# Patient Record
Sex: MALE | Race: WHITE | NOT HISPANIC OR LATINO | Employment: FULL TIME | URBAN - NONMETROPOLITAN AREA
[De-identification: names, ages, dates, MRNs, and addresses within clinical notes are randomized per-mention and may not be internally consistent; named-entity substitution may affect disease eponyms.]

---

## 2020-01-19 ENCOUNTER — HOSPITAL ENCOUNTER (EMERGENCY)
Facility: HOSPITAL | Age: 35
Discharge: HOME | End: 2020-01-19
Attending: EMERGENCY MEDICINE
Payer: OTHER MISCELLANEOUS

## 2020-01-19 ENCOUNTER — APPOINTMENT (EMERGENCY)
Dept: CT IMAGING | Facility: HOSPITAL | Age: 35
End: 2020-01-19
Payer: OTHER MISCELLANEOUS

## 2020-01-19 VITALS
RESPIRATION RATE: 18 BRPM | SYSTOLIC BLOOD PRESSURE: 108 MMHG | HEART RATE: 78 BPM | OXYGEN SATURATION: 95 % | DIASTOLIC BLOOD PRESSURE: 56 MMHG | WEIGHT: 227.96 LBS | TEMPERATURE: 98.6 F | BODY MASS INDEX: 33.76 KG/M2 | HEIGHT: 69 IN
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
## 2020-01-19 DIAGNOSIS — S06.0X0A CONCUSSION WITH NO LOSS OF CONSCIOUSNESS: Primary | ICD-10-CM

## 2020-01-19 PROCEDURE — 99284 EMERGENCY DEPT VISIT MOD MDM: CPT

## 2020-01-19 PROCEDURE — 70450 CT HEAD/BRAIN W/O DYE: CPT

## 2020-01-19 PROCEDURE — 99284 EMERGENCY DEPT VISIT MOD MDM: CPT | Performed by: EMERGENCY MEDICINE

## 2020-01-19 RX ORDER — ACETAMINOPHEN 325 MG/1
650 TABLET ORAL ONCE
Status: COMPLETED | OUTPATIENT
Start: 2020-01-19 | End: 2020-01-19

## 2020-01-19 RX ORDER — ONDANSETRON 4 MG/1
4 TABLET, ORALLY DISINTEGRATING ORAL ONCE
Status: COMPLETED | OUTPATIENT
Start: 2020-01-19 | End: 2020-01-19

## 2020-01-19 RX ORDER — ONDANSETRON 4 MG/1
4 TABLET, ORALLY DISINTEGRATING ORAL EVERY 8 HOURS PRN
Qty: 20 TABLET | Refills: 0 | Status: SHIPPED | OUTPATIENT
Start: 2020-01-19 | End: 2020-09-16 | Stop reason: ALTCHOICE

## 2020-01-19 RX ADMIN — ONDANSETRON 4 MG: 4 TABLET, ORALLY DISINTEGRATING ORAL at 20:19

## 2020-01-19 RX ADMIN — ACETAMINOPHEN 650 MG: 325 TABLET, FILM COATED ORAL at 20:19

## 2020-01-20 NOTE — ED PROVIDER NOTES
History  Chief Complaint   Patient presents with    Head Injury     patient reports that he fell backwards after slipping on ice 1/2 hour, striking his posterior head on sidewalk  denies any loc or being on blood thinners  he does reports nausea and headache  31 yo left hand dominant male  sustained a same level fall backwards after slipping on some ice approximately 30 minutes prior to arrival   There was no loss of consciousness he is not anticoagulated he was wearing as hat and park a [place] at the time  He complains of posterior left-sided headache  He feels his vision is slightly fun fuzzy and he is nauseated but he has had no vomiting he denies prior history of head injury or concussions  There is no malocclusion denies any neck pain he has no numbness or tingling  He denies any upper lower back pain no rib pain no shortness of breath or chest pain he has no abdominal or hip pain  Prior to this he was in his usual state of health  None       History reviewed  No pertinent past medical history  History reviewed  No pertinent surgical history  History reviewed  No pertinent family history  I have reviewed and agree with the history as documented  Social History     Tobacco Use    Smoking status: Current Every Day Smoker     Packs/day: 0 50     Types: Cigarettes    Smokeless tobacco: Never Used   Substance Use Topics    Alcohol use: Yes     Comment: social    Drug use: Never        Review of Systems   Constitutional: Negative for activity change, chills and fever  HENT: Negative for congestion, ear pain, rhinorrhea, sneezing and sore throat  Eyes: Positive for visual disturbance (fuzzy)  Negative for discharge  Respiratory: Negative for cough and shortness of breath  Cardiovascular: Negative for chest pain and leg swelling  Gastrointestinal: Positive for nausea  Negative for abdominal pain, blood in stool, diarrhea and vomiting     Endocrine: Negative for polyuria  Musculoskeletal: Negative for back pain, myalgias, neck pain and neck stiffness  Skin: Negative for rash  Neurological: Positive for headaches  Negative for dizziness, weakness, light-headedness and numbness  Hematological: Negative for adenopathy  Psychiatric/Behavioral: Negative for confusion  All other systems reviewed and are negative  Physical Exam  Physical Exam   Constitutional: He is oriented to person, place, and time  He appears well-developed  No distress  HENT:   Head: Normocephalic and atraumatic  Right Ear: External ear normal    Left Ear: External ear normal    Nose: Nose normal    Mouth/Throat: Oropharynx is clear and moist  No oropharyngeal exudate  TMS pale bilaterally; atraumatic no tenderness to scalp no evidence of contusion  Eyes: Pupils are equal, round, and reactive to light  Conjunctivae and EOM are normal  Right eye exhibits no discharge  Left eye exhibits no discharge  3mm equal pupils Visual acuity rosenbalm  uncorrected 20/30 on the left 2025-1 on the right; no Khari Antoine pupil  Neck: Normal range of motion  Neck supple  No distracting injury no midline or paraspinous tenderness  Cardiovascular: Normal rate, regular rhythm, normal heart sounds and intact distal pulses  Pulmonary/Chest: Effort normal and breath sounds normal  No stridor  No respiratory distress  He has no wheezes  He has no rales  He exhibits no tenderness  Abdominal: Soft  Bowel sounds are normal  He exhibits no distension and no mass  There is no tenderness  There is no rebound and no guarding  Musculoskeletal: Normal range of motion  He exhibits no edema, tenderness or deformity  Neurological: He is alert and oriented to person, place, and time  He displays normal reflexes  No cranial nerve deficit  He exhibits normal muscle tone  Coordination normal    GCS 15; no pronator drift equal hand    Skin: He is not diaphoretic  Vitals reviewed        Vital Signs  ED Triage Vitals [01/19/20 1944]   Temperature Pulse Respirations Blood Pressure SpO2   98 6 °F (37 °C) 94 17 144/79 97 %      Temp Source Heart Rate Source Patient Position - Orthostatic VS BP Location FiO2 (%)   Temporal Monitor Sitting Right arm --      Pain Score       4           Vitals:    01/19/20 1944 01/19/20 2015 01/19/20 2030 01/19/20 2130   BP: 144/79 117/65 117/62 108/56   Pulse: 94 87 82 78   Patient Position - Orthostatic VS: Sitting            Visual Acuity  Visual Acuity      Most Recent Value   L Pupil Size (mm)  3   R Pupil Size (mm)  3          ED Medications  Medications   ondansetron (ZOFRAN-ODT) dispersible tablet 4 mg (4 mg Oral Given 1/19/20 2019)   acetaminophen (TYLENOL) tablet 650 mg (650 mg Oral Given 1/19/20 2019)       Diagnostic Studies  Results Reviewed     None                 CT head without contrast   Final Result by Danisha Serrato DO (01/19 2120)      No acute intracranial abnormality  Workstation performed: [id number]                    Procedures  Procedures         ED Course  ED Course as of Jan 20 2522   Sherryle Smoke Reviewed CT findings with the patient  He feels improved  He is to follow up with care now for further occupational health  Reviewed if he has persistent symptoms may benefit from neuropsych testing  Otherwise activity as tolerated Zofran as needed for nausea  MDM  Number of Diagnoses or Management Options  Diagnosis management comments: Mdm:  Patient demonstrating nonfocal neuro exam   Will proceed with CT head secondary to same level fall  Initiate symptomatic management with Zofran and Tylenol          Disposition  Final diagnoses:   Concussion with no loss of consciousness   Fall due to slipping on ice or snow     Time reflects when diagnosis was documented in both MDM as applicable and the Disposition within this note     Time User Action Codes Description Comment    1/19/2020 10:12 PM Charlie Cunha Bibiana [S06 0X0A] Concussion with no loss of consciousness     1/19/2020 10:20 PM Radha Morales [W00  [id number]] Fall due to slipping on ice or snow       ED Disposition     ED Disposition Condition Date/Time Comment    Discharge Stable Sun Jan 19, 2020 10:22 PM Moy Wilhelm discharge to home/self care  Follow-up Information     Follow up With Specialties Details Why Contact Info Additional Information    [facility] Go to  workman's comp eval no appoiment needed [address] [place] [id number]  [phone] [address] [place] [address], [place] [address]          Discharge Medication List as of 1/19/2020 10:26 PM      START taking these medications    Details   ondansetron (ZOFRAN-ODT) 4 mg disintegrating tablet Take 1 tablet (4 mg total) by mouth every 8 (eight) hours as needed for nausea or vomiting for up to 20 doses, Starting Sun 1/19/2020, Print           No discharge procedures on file      ED Provider  Electronically Signed by           Valerie Preciado MD  01/20/20 8457

## 2020-04-23 ENCOUNTER — HOSPITAL ENCOUNTER (EMERGENCY)
Facility: HOSPITAL | Age: 35
Discharge: HOME | End: 2020-04-23
Attending: EMERGENCY MEDICINE | Admitting: EMERGENCY MEDICINE
Payer: COMMERCIAL

## 2020-04-23 ENCOUNTER — APPOINTMENT (EMERGENCY)
Dept: RADIOLOGY | Facility: HOSPITAL | Age: 35
End: 2020-04-23
Payer: COMMERCIAL

## 2020-04-23 VITALS
BODY MASS INDEX: 33.47 KG/M2 | SYSTOLIC BLOOD PRESSURE: 107 MMHG | DIASTOLIC BLOOD PRESSURE: 52 MMHG | TEMPERATURE: 97.5 F | HEART RATE: 82 BPM | WEIGHT: 225.97 LBS | RESPIRATION RATE: 18 BRPM | HEIGHT: 69 IN | OXYGEN SATURATION: 96 %

## 2020-04-23 DIAGNOSIS — R09.1 PLEURISY: Primary | ICD-10-CM

## 2020-04-23 LAB
ALBUMIN SERPL BCP-MCNC: 4.3 G/DL (ref 3.5–5)
ALP SERPL-CCNC: 65 U/L (ref 46–116)
ALT SERPL W P-5'-P-CCNC: 85 U/L (ref 12–78)
ANION GAP SERPL CALCULATED.3IONS-SCNC: 7 MMOL/L (ref 4–13)
AST SERPL W P-5'-P-CCNC: 29 U/L (ref 5–45)
BASOPHILS # BLD AUTO: 0.05 THOUSANDS/ΜL (ref 0–0.1)
BASOPHILS NFR BLD AUTO: 1 % (ref 0–1)
BILIRUB SERPL-MCNC: 0.4 MG/DL (ref 0.2–1)
BUN SERPL-MCNC: 15 MG/DL (ref 5–25)
CALCIUM SERPL-MCNC: 9 MG/DL (ref 8.3–10.1)
CHLORIDE SERPL-SCNC: 104 MMOL/L (ref 100–108)
CO2 SERPL-SCNC: 29 MMOL/L (ref 21–32)
CREAT SERPL-MCNC: 1.16 MG/DL (ref 0.6–1.3)
D DIMER PPP FEU-MCNC: <0.27 UG/ML FEU
EOSINOPHIL # BLD AUTO: 0.24 THOUSAND/ΜL (ref 0–0.61)
EOSINOPHIL NFR BLD AUTO: 4 % (ref 0–6)
ERYTHROCYTE [DISTWIDTH] IN BLOOD BY AUTOMATED COUNT: 12.2 % (ref 11.6–15.1)
GFR SERPL CREATININE-BSD FRML MDRD: 83 ML/MIN/1.73SQ M
GLUCOSE SERPL-MCNC: 114 MG/DL (ref 65–140)
HCT VFR BLD AUTO: 46 % (ref 36.5–49.3)
HGB BLD-MCNC: 16.1 G/DL (ref 12–17)
IMM GRANULOCYTES # BLD AUTO: 0.01 THOUSAND/UL (ref 0–0.2)
IMM GRANULOCYTES NFR BLD AUTO: 0 % (ref 0–2)
LYMPHOCYTES # BLD AUTO: 3.09 THOUSANDS/ΜL (ref 0.6–4.47)
LYMPHOCYTES NFR BLD AUTO: 51 % (ref 14–44)
MCH RBC QN AUTO: 31.8 PG (ref 26.8–34.3)
MCHC RBC AUTO-ENTMCNC: 35 G/DL (ref 31.4–37.4)
MCV RBC AUTO: 91 FL (ref 82–98)
MONOCYTES # BLD AUTO: 0.81 THOUSAND/ΜL (ref 0.17–1.22)
MONOCYTES NFR BLD AUTO: 14 % (ref 4–12)
NEUTROPHILS # BLD AUTO: 1.81 THOUSANDS/ΜL (ref 1.85–7.62)
NEUTS SEG NFR BLD AUTO: 30 % (ref 43–75)
NRBC BLD AUTO-RTO: 0 /100 WBCS
NT-PROBNP SERPL-MCNC: 16 PG/ML
PLATELET # BLD AUTO: 305 THOUSANDS/UL (ref 149–390)
PMV BLD AUTO: 9.8 FL (ref 8.9–12.7)
POTASSIUM SERPL-SCNC: 3.6 MMOL/L (ref 3.5–5.3)
PROT SERPL-MCNC: 8 G/DL (ref 6.4–8.2)
RBC # BLD AUTO: 5.06 MILLION/UL (ref 3.88–5.62)
SODIUM SERPL-SCNC: 140 MMOL/L (ref 136–145)
TROPONIN I SERPL-MCNC: <0.02 NG/ML
WBC # BLD AUTO: 6.01 THOUSAND/UL (ref 4.31–10.16)

## 2020-04-23 PROCEDURE — 85379 FIBRIN DEGRADATION QUANT: CPT | Performed by: EMERGENCY MEDICINE

## 2020-04-23 PROCEDURE — 80053 COMPREHEN METABOLIC PANEL: CPT | Performed by: EMERGENCY MEDICINE

## 2020-04-23 PROCEDURE — 36415 COLL VENOUS BLD VENIPUNCTURE: CPT | Performed by: EMERGENCY MEDICINE

## 2020-04-23 PROCEDURE — 84484 ASSAY OF TROPONIN QUANT: CPT | Performed by: EMERGENCY MEDICINE

## 2020-04-23 PROCEDURE — 96374 THER/PROPH/DIAG INJ IV PUSH: CPT

## 2020-04-23 PROCEDURE — 93005 ELECTROCARDIOGRAM TRACING: CPT

## 2020-04-23 PROCEDURE — 99284 EMERGENCY DEPT VISIT MOD MDM: CPT

## 2020-04-23 PROCEDURE — 99285 EMERGENCY DEPT VISIT HI MDM: CPT | Performed by: EMERGENCY MEDICINE

## 2020-04-23 PROCEDURE — 85025 COMPLETE CBC W/AUTO DIFF WBC: CPT | Performed by: EMERGENCY MEDICINE

## 2020-04-23 PROCEDURE — 71045 X-RAY EXAM CHEST 1 VIEW: CPT

## 2020-04-23 PROCEDURE — 83880 ASSAY OF NATRIURETIC PEPTIDE: CPT | Performed by: EMERGENCY MEDICINE

## 2020-04-23 RX ORDER — KETOROLAC TROMETHAMINE 30 MG/ML
15 INJECTION, SOLUTION INTRAMUSCULAR; INTRAVENOUS ONCE
Status: COMPLETED | OUTPATIENT
Start: 2020-04-23 | End: 2020-04-23

## 2020-04-23 RX ADMIN — KETOROLAC TROMETHAMINE 15 MG: 30 INJECTION, SOLUTION INTRAMUSCULAR at 21:47

## 2020-04-24 LAB
ATRIAL RATE: 84 BPM
P AXIS: 36 DEGREES
PR INTERVAL: 152 MS
QRS AXIS: 86 DEGREES
QRSD INTERVAL: 92 MS
QT INTERVAL: 360 MS
QTC INTERVAL: 425 MS
T WAVE AXIS: 45 DEGREES
VENTRICULAR RATE: 84 BPM

## 2020-04-24 PROCEDURE — 93010 ELECTROCARDIOGRAM REPORT: CPT | Performed by: INTERNAL MEDICINE

## 2020-09-16 ENCOUNTER — HOSPITAL ENCOUNTER (EMERGENCY)
Facility: HOSPITAL | Age: 35
Discharge: HOME | End: 2020-09-16
Attending: EMERGENCY MEDICINE | Admitting: EMERGENCY MEDICINE
Payer: COMMERCIAL

## 2020-09-16 VITALS
TEMPERATURE: 97.6 F | RESPIRATION RATE: 18 BRPM | BODY MASS INDEX: 33.97 KG/M2 | DIASTOLIC BLOOD PRESSURE: 90 MMHG | OXYGEN SATURATION: 97 % | WEIGHT: 230 LBS | HEART RATE: 95 BPM | SYSTOLIC BLOOD PRESSURE: 152 MMHG

## 2020-09-16 DIAGNOSIS — Z20.822 ENCOUNTER FOR LABORATORY TESTING FOR COVID-19 VIRUS: Primary | ICD-10-CM

## 2020-09-16 PROCEDURE — U0003 INFECTIOUS AGENT DETECTION BY NUCLEIC ACID (DNA OR RNA); SEVERE ACUTE RESPIRATORY SYNDROME CORONAVIRUS 2 (SARS-COV-2) (CORONAVIRUS DISEASE [COVID-19]), AMPLIFIED PROBE TECHNIQUE, MAKING USE OF HIGH THROUGHPUT TECHNOLOGIES AS DESCRIBED BY CMS-2020-01-R: HCPCS | Performed by: EMERGENCY MEDICINE

## 2020-09-16 PROCEDURE — 99284 EMERGENCY DEPT VISIT MOD MDM: CPT | Performed by: EMERGENCY MEDICINE

## 2020-09-16 PROCEDURE — 99282 EMERGENCY DEPT VISIT SF MDM: CPT

## 2020-09-16 NOTE — Clinical Note
Ester Antonio was seen and treated in our emergency department on 9/16/2020  No limitations once cleared  Diagnosis: Screening for COVID-19  Jaziel Ewing    He may return on this date: 09/30/2020    Jaziel Ewing had symptoms possibly suggestive of COVID-19  He was asymptomatic at the time of evaluation  Testing for COVID-19 is pending  He may return to work if asymptomatic for at least 3 days and at least 7 days have passed since symptom onset  If you have any questions or concerns, please don't hesitate to call        Camilla Aden MD    ______________________________           _______________          _______________  Hospital Representative                              Date                                Time

## 2020-09-17 NOTE — ED PROVIDER NOTES
History  Chief Complaint   Patient presents with    Labs Only     Pt states he was vomiting the past two days with diarrhea and had a fever yesterday  Everything has since resolved  States he cannot go back to work without COVID swab and states that is all he is here for  47-year-old male with no pertinent past medical history who is presenting requesting testing for COVID-19  Patient was in his usual state of health until 2 days ago when he developed fever as high as 101°, 2 episodes of vomiting, and 3-4 episodes of nonbloody diarrhea yesterday  Patient states that he last had a fever this morning  He did not take any antipyretics today  His symptoms resolved completely without intervention  He has no sick contacts  He reports that his employer is requiring him to get a COVID-19 test prior to returning to work  As stated above, he is completely asymptomatic at this time  Review of systems is negative  None       History reviewed  No pertinent past medical history  History reviewed  No pertinent surgical history  History reviewed  No pertinent family history  I have reviewed and agree with the history as documented  E-Cigarette/Vaping     E-Cigarette/Vaping Substances     Social History     Tobacco Use    Smoking status: Current Every Day Smoker     Packs/day: 1 00     Types: Cigarettes    Smokeless tobacco: Never Used   Substance Use Topics    Alcohol use: Yes     Frequency: Monthly or less     Comment: social    Drug use: Never       Review of Systems   Constitutional: Negative for diaphoresis, fever and unexpected weight change  HENT: Negative for congestion, rhinorrhea and sore throat  Eyes: Negative for pain, discharge and visual disturbance  Respiratory: Negative for cough, shortness of breath and wheezing  Cardiovascular: Negative for chest pain, palpitations and leg swelling     Gastrointestinal: Negative for abdominal pain, blood in stool, constipation, diarrhea, nausea and vomiting  Genitourinary: Negative for dysuria, flank pain and hematuria  Musculoskeletal: Negative for arthralgias and myalgias  Skin: Negative for rash and wound  Allergic/Immunologic: Negative for environmental allergies and food allergies  Neurological: Negative for dizziness, seizures, weakness and numbness  Hematological: Negative for adenopathy  Psychiatric/Behavioral: Negative for confusion and hallucinations  Physical Exam  Physical Exam  Vitals signs and nursing note reviewed  Constitutional:       Appearance: He is well-developed  He is not diaphoretic  HENT:      Head: Normocephalic and atraumatic  Right Ear: External ear normal       Left Ear: External ear normal       Ears:      Comments: Bilateral TMs appear normal      Nose: Nose normal    Eyes:      Pupils: Pupils are equal, round, and reactive to light  Cardiovascular:      Rate and Rhythm: Normal rate and regular rhythm  Heart sounds: Normal heart sounds  No murmur  Pulmonary:      Effort: Pulmonary effort is normal  No respiratory distress  Breath sounds: Normal breath sounds  No wheezing or rales  Abdominal:      General: Bowel sounds are normal  There is no distension  Palpations: Abdomen is soft  Tenderness: There is no abdominal tenderness  There is no guarding  Musculoskeletal: Normal range of motion  General: No deformity  Skin:     General: Skin is warm and dry  Capillary Refill: Capillary refill takes less than 2 seconds  Neurological:      Mental Status: He is alert and oriented to person, place, and time  Comments: No gross motor deficits noted  Cranial nerves II-XII are intact  Speech is fluent without dysarthria or aphasia     Psychiatric:         Mood and Affect: Mood normal          Behavior: Behavior normal          Vital Signs  ED Triage Vitals [09/16/20 2036]   Temperature Pulse Respirations Blood Pressure SpO2   97 6 °F (36 4 °C) 95 18 152/90 97 %      Temp Source Heart Rate Source Patient Position - Orthostatic VS BP Location FiO2 (%)   Oral Monitor Sitting Right arm --      Pain Score       --           Vitals:    09/16/20 2036   BP: 152/90   Pulse: 95   Patient Position - Orthostatic VS: Sitting         Visual Acuity      ED Medications  Medications - No data to display    Diagnostic Studies  Results Reviewed     Procedure Component Value Units Date/Time    Novel Coronavirus (COVID-19), PCR [facility] [[id number]]     Lab Status:  No result Specimen:  Nares from Nose                  No orders to display              Procedures  Procedures         ED Course                                       MDM  Number of Diagnoses or Management Options  Encounter for laboratory testing for COVID-19 virus: new and requires workup  Diagnosis management comments:     Patient presented with fever and GI symptoms possibly concerning for COVID-19 or viral gastroenteritis  Per his employers request, the patient needed testing for COVID-19 in order to return to work  This was ordered  I told the patient that results would not be available for several days  Advised him to isolated home pending results  Told the patient that he could return to work if asymptomatic for at least 3 days and if at least 7 days had passed since symptom onset  Discussed return precautions  Patient verbalized understanding         Amount and/or Complexity of Data Reviewed  Clinical lab tests: ordered  Decide to obtain previous medical records or to obtain history from someone other than the patient: yes  Review and summarize past medical records: yes    Risk of Complications, Morbidity, and/or Mortality  Presenting problems: minimal  Diagnostic procedures: minimal  Management options: minimal    Patient Progress  Patient progress: stable      Disposition  Final diagnoses:   Encounter for laboratory testing for COVID-19 virus     Time reflects when diagnosis was documented in both MDM as applicable and the Disposition within this note     Time User Action Codes Description Comment    9/16/2020  9:00 PM Dejosue Loyai Add [Z11 [id number]] Encounter for laboratory testing for COVID-19 virus       ED Disposition     ED Disposition Condition Date/Time Comment    Discharge Good Wed Sep 16, 2020  9:00 PM Mann Romero discharge to home/self care  Follow-up Information     Follow up With Specialties Details Why [place] [address] Emergency Department Emergency Medicine Go to  If symptoms worsen  [place] [id number]  [phone] [place] ED, [address], [address]          Patient's Medications   Discharge Prescriptions    No medications on file     No discharge procedures on file      PDMP Review     None          ED Provider  Electronically Signed by           Nilton Spence MD  09/16/20 5126

## 2020-09-17 NOTE — DISCHARGE INSTRUCTIONS
As we discussed, we are testing you for COVID-19  The results will not be available for several days  We will call you with the results, whether positive or negative  Until you receive the results, I recommend the you isolate at home  Refer to instructions below  You may return to work if you are asymptomatic for at least 3 days and it has been at least 7 days since your symptoms started  [address]    Your healthcare provider and/or public health staff have evaluated you and have determined that you do not need to remain in the hospital at this time  At this time you can be isolated at home where you will be monitored by staff from your local or state health department  You should carefully follow the prevention and isolation steps below until a healthcare provider or local or state health department says that you can return to your normal activities  Stay home except to get medical care    People who are mildly ill with COVID-19 are able to isolate at home during their illness  You should restrict activities outside your home, except for getting medical care  Do not go to work, school, or public areas  Avoid using public transportation, ride-sharing, or taxis  Separate yourself from other people and animals in your home    People: As much as possible, you should stay in a specific room and away from other people in your home  Also, you should use a separate bathroom, if available  Animals: You should restrict contact with pets and other animals while you are sick with COVID-19, just like you would around other people  Although there have not been reports of pets or other animals becoming sick with COVID-19, it is still recommended that people sick with COVID-19 limit contact with animals until more information is known about the virus  When possible, have another member of your household care for your animals while you are sick   If you are sick with COVID-19, avoid contact with your pet, including petting, snuggling, being kissed or licked, and sharing food  If you must care for your pet or be around animals while you are sick, wash your hands before and after you interact with pets and wear a facemask  See COVID-19 and Animals for more information  Call ahead before visiting your doctor    If you have a medical appointment, call the healthcare provider and tell them that you have or may have COVID-19  This will help the healthcare providers office take steps to keep other people from getting infected or exposed  Wear a facemask    You should wear a facemask when you are around other people (e g , sharing a room or vehicle) or pets and before you enter a healthcare providers office  If you are not able to wear a facemask (for example, because it causes trouble breathing), then people who live with you should not stay in the same room with you, or they should wear a facemask if they enter your room  Cover your coughs and sneezes    Cover your mouth and nose with a tissue when you cough or sneeze  Throw used tissues in a lined trash can  Immediately wash your hands with soap and water for at least 20 seconds or, if soap and water are not available, clean your hands with an alcohol-based hand  that contains at least 60% alcohol  Clean your hands often    Wash your hands often with soap and water for at least 20 seconds, especially after blowing your nose, coughing, or sneezing; going to the bathroom; and before eating or preparing food  If soap and water are not readily available, use an alcohol-based hand  with at least 60% alcohol, covering all surfaces of your hands and rubbing them together until they feel dry  Soap and water are the best option if hands are visibly dirty  Avoid touching your eyes, nose, and mouth with unwashed hands      Avoid sharing personal household items    You should not share dishes, drinking glasses, cups, eating utensils, towels, or bedding with other people or pets in your home  After using these items, they should be washed thoroughly with soap and water  Clean all high-touch surfaces everyday    High touch surfaces include counters, tabletops, doorknobs, bathroom fixtures, toilets, phones, keyboards, tablets, and bedside tables  Also, clean any surfaces that may have blood, stool, or body fluids on them  Use a household cleaning spray or wipe, according to the label instructions  Labels contain instructions for safe and effective use of the cleaning product including precautions you should take when applying the product, such as wearing gloves and making sure you have good ventilation during use of the product  Monitor your symptoms    Seek prompt medical attention if your illness is worsening (e g , difficulty breathing)  Before seeking care, call your healthcare provider and tell them that you have, or are being evaluated for, COVID-19  Put on a facemask before you enter the facility  These steps will help the healthcare providers office to keep other people in the office or waiting room from getting infected or exposed  Ask your healthcare provider to call the local or [facility] health department  Persons who are placed under active monitoring or facilitated self-monitoring should follow instructions provided by their local health department or occupational health professionals, as appropriate  If you have a medical emergency and need to call 911, notify the dispatch personnel that you have, or are being evaluated for COVID-19  If possible, put on a facemask before emergency medical services arrive  Discontinuing home isolation    Patients with confirmed COVID-19 should remain under home isolation precautions until the following conditions are met:      They have had no fever for at least 24 hours (that is one full day of no fever without the use medicine that reduces fevers)  AND  other symptoms have improved (for example, when their cough or shortness of breath have improved)  AND  at least 7 days have passed since their symptoms first appeared  Patients with confirmed COVID-19 should also notify close contacts (including their workplace) and ask that they self-quarantine  Currently, close contact is defined as being within 6 feet for 10 minutes or more from the period 48 hours before symptom onset to the time at which the patient went into isolation  Close contacts of patients diagnosed with COVID-19 should be instructed by the patient to self-quarantine for 14 days from the last time of their last contact with the patient       Source: [employer]

## 2020-09-17 NOTE — ED NOTES
Pt states he came to ER for a covid test  Presently asymptomatic        Kiara Carr, DIAMANTE  09/16/20 0781

## 2020-09-19 LAB — SARS-COV-2 RNA SPEC QL NAA+PROBE: NOT DETECTED

## 2021-03-04 ENCOUNTER — HOSPITAL ENCOUNTER (EMERGENCY)
Facility: HOSPITAL | Age: 36
Discharge: HOME | End: 2021-03-04
Attending: EMERGENCY MEDICINE
Payer: COMMERCIAL

## 2021-03-04 ENCOUNTER — APPOINTMENT (EMERGENCY)
Dept: RADIOLOGY | Facility: HOSPITAL | Age: 36
End: 2021-03-04
Payer: COMMERCIAL

## 2021-03-04 VITALS
RESPIRATION RATE: 16 BRPM | DIASTOLIC BLOOD PRESSURE: 77 MMHG | SYSTOLIC BLOOD PRESSURE: 136 MMHG | BODY MASS INDEX: 33.44 KG/M2 | TEMPERATURE: 98.7 F | HEART RATE: 85 BPM | WEIGHT: 226.41 LBS | OXYGEN SATURATION: 98 %

## 2021-03-04 DIAGNOSIS — S60.212A CONTUSION OF LEFT WRIST, INITIAL ENCOUNTER: ICD-10-CM
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
## 2021-03-04 PROCEDURE — 73130 X-RAY EXAM OF HAND: CPT

## 2021-03-04 PROCEDURE — 99284 EMERGENCY DEPT VISIT MOD MDM: CPT | Performed by: EMERGENCY MEDICINE

## 2021-03-04 PROCEDURE — 99284 EMERGENCY DEPT VISIT MOD MDM: CPT

## 2021-03-04 RX ORDER — ACETAMINOPHEN 325 MG/1
975 TABLET ORAL ONCE
Status: COMPLETED | OUTPATIENT
Start: 2021-03-04 | End: 2021-03-04

## 2021-03-04 RX ADMIN — ACETAMINOPHEN 975 MG: 325 TABLET, FILM COATED ORAL at 20:02

## 2021-03-04 NOTE — Clinical Note
Reece Benedict was seen and treated in our emergency department on 3/4/2021  Diagnosis:     Jessica Mckinney  may return to work on return date  He may return on this date: 03/05/2021    Mr Sloan was seen in the  [facility] ER on 4 March 21  He can return to work on 5 March  He should be permitted light duty for the next 7 days to allow his left wrist injury to heal  Thank you  If you have any questions or concerns, please don't hesitate to call        Nanette Raya DO    ______________________________           _______________          _______________  Hospital Representative                              Date                                Time

## 2021-03-05 NOTE — ED NOTES
Patient ambulated thru department per Dr Jeremy Bunn request  Patient tolerated the ambulation well and Dr Jeremy Bunn will be notified  Patient is now resting in bed and offers no other complaints or needs at this time        [address] [place] [address]  03/04/21 2035
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
## 2021-03-11 ENCOUNTER — OFFICE VISIT (OUTPATIENT)
Dept: OBGYN CLINIC | Facility: CLINIC | Age: 36
End: 2021-03-11
Payer: COMMERCIAL

## 2021-03-11 VITALS
SYSTOLIC BLOOD PRESSURE: 133 MMHG | DIASTOLIC BLOOD PRESSURE: 78 MMHG | WEIGHT: 226 LBS | HEART RATE: 87 BPM | BODY MASS INDEX: 33.47 KG/M2 | HEIGHT: 69 IN

## 2021-03-11 DIAGNOSIS — S60.222A CONTUSION OF LEFT HAND, INITIAL ENCOUNTER: Primary | ICD-10-CM

## 2021-03-11 PROCEDURE — 99203 OFFICE O/P NEW LOW 30 MIN: CPT | Performed by: ORTHOPAEDIC SURGERY
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
## 2021-03-11 NOTE — LETTER
March 11, 2021     Patient: Marcia Joseph   YOB: 1985   Date of Visit: 3/11/2021       To Whom it May Concern:    Edy Horacechristo is under my professional care  He was seen in my office on 3/11/2021  He is cleared to resume full duty work responsibilities to tolerance beginning 3/11/2021  If you have any questions or concerns, please don't hesitate to call           Sincerely,          Adri Parsons DO        CC: No Recipients

## 2023-05-01 ENCOUNTER — OFFICE VISIT (OUTPATIENT)
Dept: URGENT CARE | Facility: CLINIC | Age: 38
End: 2023-05-01

## 2023-05-01 VITALS
HEIGHT: 69 IN | WEIGHT: 229 LBS | TEMPERATURE: 98.1 F | DIASTOLIC BLOOD PRESSURE: 85 MMHG | OXYGEN SATURATION: 97 % | HEART RATE: 98 BPM | RESPIRATION RATE: 18 BRPM | SYSTOLIC BLOOD PRESSURE: 158 MMHG | BODY MASS INDEX: 33.92 KG/M2

## 2023-05-01 DIAGNOSIS — R03.0 ELEVATED BLOOD PRESSURE READING: ICD-10-CM

## 2023-05-01 DIAGNOSIS — S91.135A PUNCTURE WOUND OF FIFTH TOE OF LEFT FOOT, INITIAL ENCOUNTER: Primary | ICD-10-CM

## 2023-05-01 NOTE — PATIENT INSTRUCTIONS
Tdap and Td Vaccines for Adults   AMBULATORY CARE:   Tdap and Td vaccines  are shots given to protect you and others around you from tetanus, diphtheria, and pertussis (whooping cough)  These are severe infections caused by bacteria  Tetanus bacteria are found in dirt, manure, and dust  The bacteria enter the body through open skin, such as puncture wounds and burns  Diphtheria and pertussis bacteria are spread from person to person  Call your local emergency number (911 in the [address]) if:   Your mouth and throat are swollen  You are wheezing or have trouble breathing  You have chest pain or your heart is beating faster than usual     Seek immediate care if:   Your face is red or swollen  You have hives that spread over your body  You feel weak or dizzy  Call your doctor if:   You have severe pain, redness, and swelling in your arm where the shot was given  You have a fever or chills  You have a headache, body aches, or joint pain  You have nausea or diarrhea, or you are vomiting  You have questions or concerns about the vaccine  Why you may need the Tdap vaccine: You did not get some or any of the recommended DTaP doses as a child  You did not get Tdap when you were 8or 15years old  You are a healthcare worker who never got a dose of Tdap  You never got a dose of Tdap and will have close contact with a baby younger than 12 months  Get the vaccine within 2 weeks of the close contact, if possible  You have a severe cut or burn  You are a pregnant woman  You need 1 dose of Tdap during each pregnancy, at 27 to 36 weeks  You have just had a baby and did not get a dose of Tdap during your pregnancy  When the Td vaccine is given:  The Td vaccine is usually given as a booster dose every 10 years  Td can also be given after 5 years if you have a severe wound or burn  Do not get the Tdap vaccine if:   You are allergic to any part of the vaccine      You had a severe allergic reaction to DTaP or DTP  You had seizures or a coma within 7 days of receiving DTaP or DTP, caused by the vaccine  You can still safely get the Td vaccine in this case  Do not get the Td vaccine if:   You had an allergic reaction to DTaP, DTP, Tdap, or Td  You are allergic to any part of the Td vaccine  Reasons to wait to get the Tdap or Td vaccine: Your provider may wait to give the vaccine until he or she feels it is safe for you  Your provider will need to know if you have or had any of the following:  A seizure disorder or a problem with your nervous system    Severe pain or swelling after a dose of DTaP, DTP, or Td    Any severe allergy    A history of Guillain-Barré syndrome    A fever of 105º F (40 5º C) after getting DTaP or DTP    A fever or any current illness    Risks of the Tdap and Td vaccines: The area where the vaccine was given may be red, tender, or swollen  This should get better in 1 to 2 days  Rarely, you may develop severe shoulder pain that lasts longer than 2 days  You may develop a fever  You may have an allergic reaction to the vaccine  This can be life-threatening  Apply a warm compress  to the injection area as directed to decrease pain and swelling  Follow up with your doctor as directed:  Write down your questions so you remember to ask them during your visits  © Joel Willis 2022 Information is for End User's use only and may not be sold, redistributed or otherwise used for commercial purposes  The above information is an  only  It is not intended as medical advice for individual conditions or treatments  Talk to your doctor, nurse or pharmacist before following any medical regimen to see if it is safe and effective for you  Puncture Wound   WHAT YOU NEED TO KNOW:   A puncture wound is a hole in the skin made by a sharp, pointed object  The area may be bruised or swollen  You may have bleeding, pain, or trouble moving the affected area  DISCHARGE INSTRUCTIONS:   Return to the emergency department if:   You have severe pain  You have numbness or tingling in the area of your wound  Your wound starts bleeding and does not stop, even after you apply pressure  Call your doctor if:   You have new drainage or a bad odor coming from the wound  You have a fever or chills  You have increased swelling, redness, or pain  You have red streaks on your skin coming from your wound  You have questions or concerns about your condition or care  Medicines: You may need any of the following:  NSAIDs , such as ibuprofen, help decrease swelling, pain, and fever  This medicine is available with or without a doctor's order  NSAIDs can cause stomach bleeding or kidney problems in certain people  If you take blood thinner medicine, always ask your healthcare provider if NSAIDs are safe for you  Always read the medicine label and follow directions  Antibiotics  help prevent a bacterial infection  Take your medicine as directed  Contact your healthcare provider if you think your medicine is not helping or if you have side effects  Tell your provider if you are allergic to any medicine  Keep a list of the medicines, vitamins, and herbs you take  Include the amounts, and when and why you take them  Bring the list or the pill bottles to follow-up visits  Carry your medicine list with you in case of an emergency  Care for your wound as directed:  Keep your wound clean and dry  When you are allowed to bathe, carefully wash the wound with soap and water  Dry the area and put on new, clean bandages as directed  Change your bandages when they get wet or dirty  Rest and elevate  the injured area above the level of your heart as often as you can  This will help decrease swelling and pain  Prop your injured area on pillows or blankets to keep it elevated comfortably     Follow up with your doctor in 2 to 3 days:  Write down your questions so you remember to ask them during your visits  © Copyright [facility] 2022 Information is for End User's use only and may not be sold, redistributed or otherwise used for commercial purposes  The above information is an  only  It is not intended as medical advice for individual conditions or treatments  Talk to your doctor, nurse or pharmacist before following any medical regimen to see if it is safe and effective for you

## 2023-05-01 NOTE — PROGRESS NOTES
[employer] [facility]        NAME: Nayeli Eisenberg is a 30 y o  male  : 1985    MRN: [id number]  DATE: May 1, 2023  TIME: 10:28 AM    Assessment and Plan   Puncture wound of fifth toe of left foot, initial encounter [S91 135A]  1  Puncture wound of fifth toe of left foot, initial encounter  Tdap Vaccine greater than or equal to 6yo      2  Elevated blood pressure reading  Ambulatory Referral to [facility]            Patient Instructions   Patient Instructions     Tdap and Td Vaccines for Adults   AMBULATORY CARE:   Tdap and Td vaccines  are shots given to protect you and others around you from tetanus, diphtheria, and pertussis (whooping cough)  These are severe infections caused by bacteria  Tetanus bacteria are found in dirt, manure, and dust  The bacteria enter the body through open skin, such as puncture wounds and burns  Diphtheria and pertussis bacteria are spread from person to person  Call your local emergency number (911 in the [address] [place] [address],[address]) if:    Your mouth and throat are swollen   You are wheezing or have trouble breathing   You have chest pain or your heart is beating faster than usual     Seek immediate care if:    Your face is red or swollen   You have hives that spread over your body   You feel weak or dizzy  Call your doctor if:    You have severe pain, redness, and swelling in your arm where the shot was given   You have a fever or chills   You have a headache, body aches, or joint pain   You have nausea or diarrhea, or you are vomiting   You have questions or concerns about the vaccine  Why you may need the Tdap vaccine:    You did not get some or any of the recommended DTaP doses as a child   You did not get Tdap when you were 6or 15years old   You are a healthcare worker who never got a dose of Tdap   You never got a dose of Tdap and will have close contact with a baby younger than 12 months   Get the vaccine within 2 weeks of the close contact, if possible   You have a severe cut or burn   You are a pregnant woman  You need 1 dose of Tdap during each pregnancy, at 27 to 36 weeks   You have just had a baby and did not get a dose of Tdap during your pregnancy  When the Td vaccine is given:  The Td vaccine is usually given as a booster dose every 10 years  Td can also be given after 5 years if you have a severe wound or burn  Do not get the Tdap vaccine if:    You are allergic to any part of the vaccine   You had a severe allergic reaction to DTaP or DTP   You had seizures or a coma within 7 days of receiving DTaP or DTP, caused by the vaccine  You can still safely get the Td vaccine in this case  Do not get the Td vaccine if:    You had an allergic reaction to DTaP, DTP, Tdap, or Td   You are allergic to any part of the Td vaccine  Reasons to wait to get the Tdap or Td vaccine: Your provider may wait to give the vaccine until he or she feels it is safe for you  Your provider will need to know if you have or had any of the following:   A seizure disorder or a problem with your nervous system     Severe pain or swelling after a dose of DTaP, DTP, or Td     Any severe allergy     A history of Guillain-Barré syndrome     A fever of 105º F (40 5º C) after getting DTaP or DTP     A fever or any current illness    Risks of the Tdap and Td vaccines: The area where the vaccine was given may be red, tender, or swollen  This should get better in 1 to 2 days  Rarely, you may develop severe shoulder pain that lasts longer than 2 days  You may develop a fever  You may have an allergic reaction to the vaccine  This can be life-threatening  Apply a warm compress  to the injection area as directed to decrease pain and swelling  Follow up with your doctor as directed:  Write down your questions so you remember to ask them during your visits    © Copyright [facility] 2022 Information is for End User's use only and may not be sold, redistributed or otherwise used for commercial purposes  The above information is an  only  It is not intended as medical advice for individual conditions or treatments  Talk to your doctor, nurse or pharmacist before following any medical regimen to see if it is safe and effective for you  Puncture Wound   WHAT YOU NEED TO KNOW:   A puncture wound is a hole in the skin made by a sharp, pointed object  The area may be bruised or swollen  You may have bleeding, pain, or trouble moving the affected area  DISCHARGE INSTRUCTIONS:   Return to the emergency department if:    You have severe pain   You have numbness or tingling in the area of your wound   Your wound starts bleeding and does not stop, even after you apply pressure  Call your doctor if:   Ilona Profit have new drainage or a bad odor coming from the wound   You have a fever or chills   You have increased swelling, redness, or pain   You have red streaks on your skin coming from your wound   You have questions or concerns about your condition or care  Medicines: You may need any of the following:   NSAIDs , such as ibuprofen, help decrease swelling, pain, and fever  This medicine is available with or without a doctor's order  NSAIDs can cause stomach bleeding or kidney problems in certain people  If you take blood thinner medicine, always ask your healthcare provider if NSAIDs are safe for you  Always read the medicine label and follow directions   Antibiotics  help prevent a bacterial infection   Take your medicine as directed  Contact your healthcare provider if you think your medicine is not helping or if you have side effects  Tell your provider if you are allergic to any medicine  Keep a list of the medicines, vitamins, and herbs you take  Include the amounts, and when and why you take them  Bring the list or the pill bottles to follow-up visits   Carry your medicine list with you in case of an emergency  Care for your wound as directed:  Keep your wound clean and dry  When you are allowed to bathe, carefully wash the wound with soap and water  Dry the area and put on new, clean bandages as directed  Change your bandages when they get wet or dirty  Rest and elevate  the injured area above the level of your heart as often as you can  This will help decrease swelling and pain  Prop your injured area on pillows or blankets to keep it elevated comfortably  Follow up with your doctor in 2 to 3 days:  Write down your questions so you remember to ask them during your visits  © Copyright Montrell Crawford 2022 Information is for End User's use only and may not be sold, redistributed or otherwise used for commercial purposes  The above information is an  only  It is not intended as medical advice for individual conditions or treatments  Talk to your doctor, nurse or pharmacist before following any medical regimen to see if it is safe and effective for you  Follow up with PCP in 3-5 days  Proceed to  ER if symptoms worsen  Chief Complaint     Chief Complaint   Patient presents with    Puncture Wound         History of Present Illness       The patient is a 82-year-old male who presents to the clinic for a puncture wound to his left foot  He states that he was wearing sneakers yesterday and stepped on a board with a antwon nail  He states that the nail did puncture his left foot  He states he had minimal amount of bleeding  He denies associated purulent drainage, fevers, redness, or streaking  He also denies numbness  He is not sure of the date of his last tetanus shot  He currently does not have a primary care doctor      Review of Systems   Review of Systems   Constitutional: Negative for chills and fever  HENT: Negative for congestion and rhinorrhea  Skin: Positive for wound  Negative for color change, pallor and rash          The patient complains of a puncture "wound on the left foot as noted above   Neurological: Negative for dizziness and numbness  Current Medications     No current outpatient medications on file  Current Allergies     Allergies as of 05/01/2023 - Reviewed 05/01/2023   Allergen Reaction Noted    Sulfate  01/19/2020            The following portions of the patient's history were reviewed and updated as appropriate: allergies, current medications, past family history, past medical history, past social history, past surgical history and problem list      History reviewed  No pertinent past medical history  History reviewed  No pertinent surgical history  History reviewed  No pertinent family history  Medications have been verified  Objective   /85   Pulse 98   Temp 98 1 °F (36 7 °C)   Resp 18   Ht 5' 9\" (1 753 m)   Wt 104 kg (229 lb)   SpO2 97%   BMI 33 82 kg/m²        Physical Exam     Physical Exam  HENT:      Head: Normocephalic  Musculoskeletal:      Left ankle: Normal       Left foot: Normal capillary refill  No swelling or deformity  Normal pulse  Feet:       Comments: There is a small puncture wound on the plantar aspect of the left foot  There is no purulent drainage  The wound is superficial with no bleeding  No foreign body is noted  Neurological:      Mental Status: He is alert              -The patient's wound was cleansed with a wound scrub and bacitracin and a patch bandage was placed  Patient was instructed to keep the wound clean and observe for signs of infection  His tetanus was updated  He was referred to a primary care doctor  He was instructed to return if he has signs of infection  I urged him to follow-up with primary care doctor to have a recheck of his blood pressure      "

## 2023-05-06 ENCOUNTER — APPOINTMENT (EMERGENCY)
Dept: RADIOLOGY | Facility: HOSPITAL | Age: 38
End: 2023-05-06

## 2023-05-06 ENCOUNTER — HOSPITAL ENCOUNTER (EMERGENCY)
Facility: HOSPITAL | Age: 38
Discharge: HOME | End: 2023-05-06
Attending: EMERGENCY MEDICINE | Admitting: EMERGENCY MEDICINE

## 2023-05-06 VITALS
OXYGEN SATURATION: 94 % | RESPIRATION RATE: 18 BRPM | SYSTOLIC BLOOD PRESSURE: 121 MMHG | HEART RATE: 90 BPM | TEMPERATURE: 98.2 F | DIASTOLIC BLOOD PRESSURE: 62 MMHG

## 2023-05-06 DIAGNOSIS — T17.908A ASPIRATION INTO RESPIRATORY TRACT, INITIAL ENCOUNTER: Primary | ICD-10-CM

## 2023-05-06 DIAGNOSIS — J18.9 PNEUMONIA: ICD-10-CM

## 2023-05-06 LAB
FLUAV RNA RESP QL NAA+PROBE: NEGATIVE
FLUBV RNA RESP QL NAA+PROBE: NEGATIVE
RSV RNA RESP QL NAA+PROBE: NEGATIVE
SARS-COV-2 RNA RESP QL NAA+PROBE: NEGATIVE

## 2023-05-06 NOTE — ED PROVIDER NOTES
History  Chief Complaint   Patient presents with   • Shortness of Breath     Shortness of breath that started about and hour ago  Patient states he was sleeping at the time and woke up coughing and short of breath  Coughing up clear phlegm      68-year-old male presents to the emergency department from home via EMS with an episode of coughing/choking lasting a few minutes upon awakening from a nap  He took a nap earlier this afternoon and upon awakening, he felt phlegm draining into the back of his throat causing him to choke  He spent several minutes coughing up clear phlegm with improvement in his sense of dyspnea over the total time of about 15 minutes  He checked his pulse oximeter at home and found it increasing from a jayesh of 82% to 95% with consistently improving dyspnea while the saturations improved  At this point his breathing feels fairly normal, and he is not in distress  He did not take any medications or treatments at home to address his symptoms  He states that he felt well earlier today  He had been able to move around his home without any sensation of dyspnea, chest tightness, or unusual fatigue  No preceding fever/chills/hemoptysis/vomiting/syncope/palpitations/chest pain/LE edema  He does not have any underlying pulmonary disease  He does smoke  He does not have any hx of sleep apnea but has never been tested for it formally  No preceding URI symptoms or recent illness prior to the symptoms today  No travel/surgery/immobilization in past 30 days  A/P: Episodic cough/dyspnea reported to be associated with posterior sinus drainage with concordant hypoxemia noted on pulse oximeter, now improved but not entirely normal   Possible viral upper vs lower respiratory infection, aspiration episode related to GERD, sleep apnea, etc  Check covid/influenza/RSV testing and CXR  Check ambulatory pulse oximetry        History provided by:  Patient, EMS personnel and medical records  Shortness of Breath  Associated symptoms: cough and wheezing    Associated symptoms: no chest pain, no diaphoresis and no fever        None       History reviewed  No pertinent past medical history  History reviewed  No pertinent surgical history  History reviewed  No pertinent family history  I have reviewed and agree with the history as documented  E-Cigarette/Vaping   • E-Cigarette Use Never User      E-Cigarette/Vaping Substances     Social History     Tobacco Use   • Smoking status: Every Day     Packs/day: 1 00     Types: Cigarettes   • Smokeless tobacco: Never   Vaping Use   • Vaping Use: Never used   Substance Use Topics   • Alcohol use: Yes     Comment: social   • Drug use: Never       Review of Systems   Constitutional: Negative for chills, diaphoresis and fever  HENT: Negative for congestion, postnasal drip, trouble swallowing and voice change  Respiratory: Positive for cough, choking, chest tightness, shortness of breath and wheezing  Cardiovascular: Negative  Negative for chest pain and palpitations  Gastrointestinal: Negative  Skin: Negative  Neurological: Negative  Hematological: Negative  All other systems reviewed and are negative  Physical Exam  Physical Exam  Vitals and nursing note reviewed  Constitutional:       General: He is awake  He is not in acute distress  Appearance: Normal appearance  He is well-developed  HENT:      Head: Normocephalic and atraumatic  Right Ear: Hearing and external ear normal       Left Ear: Hearing and external ear normal       Nose: Mucosal edema present  No congestion or rhinorrhea  Right Turbinates: Enlarged  Left Turbinates: Enlarged  Mouth/Throat:      Lips: Pink  Mouth: Mucous membranes are moist       Pharynx: Oropharynx is clear  Uvula midline  No pharyngeal swelling, oropharyngeal exudate or posterior oropharyngeal erythema  Neck:      Thyroid: No thyroid mass, thyromegaly or thyroid tenderness  Trachea: Trachea and phonation normal    Cardiovascular:      Rate and Rhythm: Regular rhythm  Tachycardia present  Pulses:           Radial pulses are 2+ on the right side and 2+ on the left side  Dorsalis pedis pulses are 2+ on the right side and 2+ on the left side  Posterior tibial pulses are 2+ on the right side and 2+ on the left side  Heart sounds: Normal heart sounds, S1 normal and S2 normal  No murmur heard  No friction rub  No gallop  Pulmonary:      Effort: Pulmonary effort is normal  No respiratory distress  Breath sounds: Normal breath sounds  No stridor  No decreased breath sounds, wheezing, rhonchi or rales  Abdominal:      General: There is no distension  Palpations: There is no mass  Tenderness: There is no abdominal tenderness  There is no guarding or rebound  Skin:     General: Skin is warm and dry  Neurological:      Mental Status: He is alert and oriented to person, place, and time  GCS: GCS eye subscore is 4  GCS verbal subscore is 5  GCS motor subscore is 6  Cranial Nerves: No cranial nerve deficit  Sensory: No sensory deficit  Motor: No abnormal muscle tone  Comments: PERRLA; EOMI  Sensation intact to light touch over face in V1-V3 distribution bilaterally  Facial expressions symmetric  Tongue/uvula midline  Shoulder shrug equal bilaterally  Strength 5/5 in UE/LE bilaterally  Sensation intact to light touch in UE/LE bilaterally           Vital Signs  ED Triage Vitals   Temperature Pulse Respirations Blood Pressure SpO2   05/06/23 1706 05/06/23 1700 05/06/23 1700 05/06/23 1700 05/06/23 1700   98 2 °F (36 8 °C) 102 18 145/68 93 %      Temp Source Heart Rate Source Patient Position - Orthostatic VS BP Location FiO2 (%)   05/06/23 1706 05/06/23 1700 05/06/23 1700 05/06/23 1700 --   Temporal Monitor Sitting Right arm       Pain Score       --                  Vitals:    05/06/23 1700 05/06/23 1800   BP: 145/68 121/62 Pulse: 102 90   Patient Position - Orthostatic VS: Sitting Sitting         Visual Acuity      ED Medications  Medications - No data to display    Diagnostic Studies  Results Reviewed     Procedure Component Value Units Date/Time    FLU/RSV/COVID - if FLU/RSV clinically relevant [[id number]]  (Normal) Collected: 05/06/23 1714    Lab Status: Final result Specimen: Nares from Nose Updated: 05/06/23 1802     SARS-CoV-2 Negative     INFLUENZA A PCR Negative     INFLUENZA B PCR Negative     RSV PCR Negative    Narrative:      FOR PEDIATRIC PATIENTS - copy/paste COVID Guidelines URL to browser: [email or web address]  [employer]    SARS-CoV-2 assay is a Nucleic Acid Amplification assay intended for the  qualitative detection of nucleic acid from SARS-CoV-2 in nasopharyngeal  swabs  Results are for the presumptive identification of SARS-CoV-2 RNA  Positive results are indicative of infection with SARS-CoV-2, the virus  causing COVID-19, but do not rule out bacterial infection or co-infection  with other viruses  Laboratories within the [place] and its  territories are required to report all positive results to the appropriate  public health authorities  Negative results do not preclude SARS-CoV-2  infection and should not be used as the sole basis for treatment or other  patient management decisions  Negative results must be combined with  clinical observations, patient history, and epidemiological information  This test has not been FDA cleared or approved  This test has been authorized by FDA under an Emergency Use Authorization  (EUA)  This test is only authorized for the duration of time the  declaration that circumstances exist justifying the authorization of the  emergency use of an in vitro diagnostic tests for detection of SARS-CoV-2  virus and/or diagnosis of COVID-19 infection under section 564(b)(1) of  the Act, 21 U  S C  263WZR-8(Y)(9), unless the authorization is terminated  or revoked sooner  The test has been validated but independent review by FDA  and CLIA is pending  Test performed using [employer] Gene[employer]pert: This RT-PCR assay targets N2,  a region unique to SARS-CoV-2  A conserved region in the E-gene was chosen  for pan-Sarbecovirus detection which includes SARS-CoV-2  According to CMS-2020-01-R, this platform meets the definition of high-throughput technology  XR chest 2 views   ED Interpretation by Jamel Hay DO (05/06 1732)   Airway is midline  Lungs are clear bilaterally with no evidence of pulmonary vascular congestion/focal infiltrate/pleural effusion/pneumothorax  Cardiac and mediastinal silhouettes are within normal limits  Osseous structures appear normal                    Procedures  Procedures         ED Course  ED Course as of 05/06/23 1824   Sat May 06, 2023   1708 ECG NSR 97 bpm  Right axis   QRS 90 Qtc 462  No ectopy  No acute st/t changes  Interpreted by me   1714 Pulse oximetry remained 93-94% on RA consistently while ambulating   1803 FLU/RSV/COVID - if FLU/RSV clinically relevant  Negative       Medical Decision Making  Patient had no episodes of respiratory distress, accessory muscle use, cough, or desaturation while in the ED  He consistently had oxygen saturation between 93-95% on RA   RSV/Influenza/Covid testing was normal; CXR was normal  No desaturation with physical activity  Based on his description of symptoms, he likely had an aspiration episode following posterior pharyngeal mucus drainage  I would not expect this to reoccur, nor would further intervention be required at this point given that this was an uncomplicated aspiration episode in a healthy adult with no underlying pulmonary disease      He does not follow with a primary physician generally; he did have an ambulatory referral to primary care placed when he was seen in an urgent care on 1 May following a puncture wound of the left foot  I advised that he do so  Several options for local physicians were included in discharge information  He should seek further care in the ED if he were to develop increasing dyspnea, fever, hemoptysis, or pleuritic pain  I answered all questions prior to discharge; the patient expressed understanding and agreed to plan  Amount and/or Complexity of Data Reviewed  Labs:  Decision-making details documented in ED Course  Radiology: ordered and independent interpretation performed  Disposition  Final diagnoses:   Aspiration into respiratory tract, initial encounter     Time reflects when diagnosis was documented in both MDM as applicable and the Disposition within this note     Time User Action Codes Description Comment    5/6/2023  6:14 PM Sincerelashay Tj Mccarty [[id number]] Aspiration into respiratory tract, initial encounter       ED Disposition     ED Disposition   Discharge    Condition   Stable    Date/Time   Sat May 6, 2023  6:14 PM    Comment   Yanira Sloan discharge to home/self care  Follow-up Information     Follow up With Specialties Details Why Contact Info Additional Information    [address]  [facility] [address] [id number]  [phone] [address], [address]   [address] [place] [address] Road [facility]   [address]  [facility] [address] [id number]  [address] [facility], [address], [place], [place], [address] [facility]   [address] [id number] [phone] [facility], [address]  [address] [facility], [place], [place], [address]          Patient's Medications    No medications on file       No discharge procedures on file      PDMP Review     None          ED Provider  Electronically Signed by           Michelle Javier DO  05/06/23 5832

## 2023-05-06 NOTE — DISCHARGE INSTRUCTIONS
You can contact any of the primary care doctors listed below for an appointment for further evaluation  No further specific treatment is needed after an aspiration episode  If you develop a fever, pain with breathing, coughing up blood, or worsening shortness of breath, please go to the ER

## 2023-05-07 RX ORDER — AMOXICILLIN 500 MG/1
1000 CAPSULE ORAL EVERY 8 HOURS SCHEDULED
Qty: 21 CAPSULE | Refills: 0 | Status: SHIPPED | OUTPATIENT
Start: 2023-05-07 | End: 2023-05-12

## 2023-05-10 LAB
ATRIAL RATE: 97 BPM
P AXIS: 45 DEGREES
PR INTERVAL: 154 MS
QRS AXIS: 96 DEGREES
QRSD INTERVAL: 90 MS
QT INTERVAL: 364 MS
QTC INTERVAL: 462 MS
T WAVE AXIS: 57 DEGREES
VENTRICULAR RATE: 97 BPM

## 2024-09-22 PROCEDURE — 99282 EMERGENCY DEPT VISIT SF MDM: CPT

## 2024-09-23 ENCOUNTER — HOSPITAL ENCOUNTER (EMERGENCY)
Facility: HOSPITAL | Age: 39
Discharge: HOME | End: 2024-09-23
Attending: EMERGENCY MEDICINE
Payer: COMMERCIAL

## 2024-09-23 VITALS
OXYGEN SATURATION: 98 % | HEART RATE: 91 BPM | BODY MASS INDEX: 32.58 KG/M2 | WEIGHT: 220 LBS | HEIGHT: 69 IN | DIASTOLIC BLOOD PRESSURE: 94 MMHG | SYSTOLIC BLOOD PRESSURE: 161 MMHG | TEMPERATURE: 97.3 F | RESPIRATION RATE: 18 BRPM

## 2024-09-23 DIAGNOSIS — L03.213 PERIORBITAL CELLULITIS: Primary | ICD-10-CM

## 2024-09-23 PROCEDURE — 99284 EMERGENCY DEPT VISIT MOD MDM: CPT | Performed by: EMERGENCY MEDICINE

## 2024-09-23 RX ORDER — OFLOXACIN 3 MG/ML
1 SOLUTION/ DROPS OPHTHALMIC 4 TIMES DAILY
Qty: 5 ML | Refills: 0 | Status: SHIPPED | OUTPATIENT
Start: 2024-09-23 | End: 2024-09-30

## 2024-09-23 RX ORDER — OFLOXACIN 3 MG/ML
1 SOLUTION/ DROPS OPHTHALMIC ONCE
Status: COMPLETED | OUTPATIENT
Start: 2024-09-23 | End: 2024-09-23

## 2024-09-23 RX ORDER — TETRACAINE HYDROCHLORIDE 5 MG/ML
1 SOLUTION OPHTHALMIC ONCE
Status: COMPLETED | OUTPATIENT
Start: 2024-09-23 | End: 2024-09-23

## 2024-09-23 RX ORDER — CEPHALEXIN 500 MG/1
500 CAPSULE ORAL EVERY 6 HOURS SCHEDULED
Qty: 28 CAPSULE | Refills: 0 | Status: SHIPPED | OUTPATIENT
Start: 2024-09-23 | End: 2024-09-30

## 2024-09-23 RX ADMIN — FLUORESCEIN SODIUM 1 STRIP: 1 STRIP OPHTHALMIC at 01:51

## 2024-09-23 RX ADMIN — OFLOXACIN 1 DROP: 3 SOLUTION/ DROPS OPHTHALMIC at 02:05

## 2024-09-23 RX ADMIN — TETRACAINE HYDROCHLORIDE 1 DROP: 5 SOLUTION OPHTHALMIC at 01:51

## 2024-09-23 RX ADMIN — CEPHALEXIN 500 MG: 250 CAPSULE ORAL at 01:51

## 2024-09-23 NOTE — ED PROVIDER NOTES
1. Periorbital cellulitis      ED Disposition       ED Disposition   Discharge    Condition   Stable    Date/Time   Mon Sep 23, 2024  2:00 AM    Comment   Zacarias Sloan discharge to home/self care.                   Assessment & Plan       Medical Decision Making  Risk  Prescription drug management.      39-year-old male presenting for evaluation of left eyelid swelling and pain.  Patient has grossly normal visual fields, normal extraocular movements, he does have some mild swelling around the periorbital region, normal visual acuity.  Pressure in the left eye is normal.  Likely periorbital cellulitis.  Will treat with Keflex.  Also performed fluorescein stain with small area of uptake, will treat with ofloxacin drops.  Will have patient follow-up with ophthalmology within 1 to 2 days.  Discussed with patient strict return precautions.  Patient expressed understanding was agreeable for discharge.               Medications   cephalexin (KEFLEX) capsule 500 mg (500 mg Oral Given 9/23/24 0151)   fluorescein sodium sterile ophthalmic strip 1 strip (1 strip Left Eye Given 9/23/24 0151)   tetracaine 0.5 % ophthalmic solution 1 drop (1 drop Left Eye Given 9/23/24 0151)   ofloxacin (OCUFLOX) 0.3 % ophthalmic solution 1 drop (1 drop Left Eye Given 9/23/24 0205)       History of Present Illness       HPI    39-year-old male presenting for evaluation of left eyelid swelling and pain.  Patient states symptoms started 2 days ago.  He states he has some discomfort over the upper eyelid and side of the face.  He states his vision feels slightly blurred in the left eye.  Denies any eye pain.  Denies any double vision.  Denies fevers.  Denies nausea or vomiting.  Denies any trauma to the eye.    Review of Systems   Constitutional:  Negative for fever.   HENT:  Positive for facial swelling.    Eyes:  Positive for visual disturbance. Negative for pain and redness.   Gastrointestinal:  Negative for nausea and vomiting.   Skin:   Negative for wound.   Neurological:  Negative for weakness and numbness.   All other systems reviewed and are negative.          Objective     ED Triage Vitals [09/23/24 0004]   Temperature Pulse Blood Pressure Respirations SpO2 Patient Position - Orthostatic VS   (!) 97.3 °F (36.3 °C) 91 161/94 18 98 % Sitting      Temp Source Heart Rate Source BP Location FiO2 (%) Pain Score    Tympanic Monitor Right arm -- 3        Physical Exam  Vitals and nursing note reviewed.   Constitutional:       General: He is not in acute distress.     Appearance: Normal appearance. He is well-developed and normal weight. He is not ill-appearing, toxic-appearing or diaphoretic.   HENT:      Head: Normocephalic and atraumatic.      Right Ear: External ear normal.      Left Ear: External ear normal.      Nose: Nose normal.      Mouth/Throat:      Mouth: Mucous membranes are moist.      Pharynx: Oropharynx is clear.   Eyes:      Intraocular pressure: Left eye pressure is 17 mmHg. Measurements were taken using an automated tonometer.     Extraocular Movements: Extraocular movements intact.      Right eye: Normal extraocular motion.      Left eye: Normal extraocular motion.      Conjunctiva/sclera: Conjunctivae normal.      Right eye: Right conjunctiva is not injected.      Left eye: Left conjunctiva is not injected.      Comments: Visual acuity 20/20 OD, 20/30 OS. Visual fields grossly normal.    Cardiovascular:      Rate and Rhythm: Normal rate and regular rhythm.      Pulses: Normal pulses.      Heart sounds: Normal heart sounds. No murmur heard.     No friction rub. No gallop.   Pulmonary:      Effort: Pulmonary effort is normal. No respiratory distress.      Breath sounds: Normal breath sounds. No wheezing or rales.   Abdominal:      General: There is no distension.      Palpations: Abdomen is soft.      Tenderness: There is no abdominal tenderness. There is no guarding or rebound.   Musculoskeletal:         General: No tenderness.       Cervical back: Neck supple.   Skin:     General: Skin is warm and dry.      Coloration: Skin is not pale.      Findings: No rash.   Neurological:      General: No focal deficit present.      Mental Status: He is alert and oriented to person, place, and time.      Cranial Nerves: No cranial nerve deficit.      Sensory: No sensory deficit.      Motor: No weakness.   Psychiatric:         Mood and Affect: Mood normal.         Behavior: Behavior normal.         Labs Reviewed - No data to display  No orders to display       Procedures    ED Medication and Procedure Management   None     Discharge Medication List as of 9/23/2024  2:02 AM        START taking these medications    Details   cephalexin (KEFLEX) 500 mg capsule Take 1 capsule (500 mg total) by mouth every 6 (six) hours for 7 days, Starting Mon 9/23/2024, Until Mon 9/30/2024, Normal      ofloxacin (OCUFLOX) 0.3 % ophthalmic solution Administer 1 drop into the left eye 4 (four) times a day for 7 days, Starting Mon 9/23/2024, Until Mon 9/30/2024, Normal           No discharge procedures on file.     Genoveva Goldsmith MD  09/23/24 [id number]

## 2024-09-23 NOTE — DISCHARGE INSTRUCTIONS
Please follow up with your eye doctor within 2-3 days.    Please take keflex 4 times per day and use eye drops 4 times per day for 7 days.

## 2024-09-23 NOTE — Clinical Note
Zacarias Sloan was seen and treated in our emergency department on 9/22/2024.                Diagnosis:     Zacarias  may return to work on return date.    He may return on this date: 09/25/2024         If you have any questions or concerns, please don't hesitate to call.      Genoveva Goldsmith MD    ______________________________           _______________          _______________  Hospital Representative                              Date                                Time

## 2025-02-25 ENCOUNTER — TELEPHONE (OUTPATIENT)
Age: 40
End: 2025-02-25

## 2025-02-25 NOTE — TELEPHONE ENCOUNTER
Patient returned call stating he is interested in talk therapy. Pt has been added to proper wait list without a referral. Patient was advised to contact his provider to request a referral to change wait list status.

## 2025-02-25 NOTE — TELEPHONE ENCOUNTER
Message was left for pt to return call. Please verify need of service and place on proper wait list

## 2025-04-10 DIAGNOSIS — E66.01 MORBID (SEVERE) OBESITY DUE TO EXCESS CALORIES (HCC): ICD-10-CM

## 2025-04-10 DIAGNOSIS — Z72.0 TOBACCO USE: ICD-10-CM

## 2025-04-10 DIAGNOSIS — R06.02 SHORTNESS OF BREATH: ICD-10-CM

## 2025-04-10 DIAGNOSIS — R74.8 ABNORMAL LEVELS OF OTHER SERUM ENZYMES: ICD-10-CM

## 2025-04-10 DIAGNOSIS — Z82.49 FAMILY HISTORY OF ISCHEMIC HEART DISEASE AND OTHER DISEASES OF THE CIRCULATORY SYSTEM: ICD-10-CM

## 2025-04-10 DIAGNOSIS — R07.9 CHEST PAIN, UNSPECIFIED: ICD-10-CM

## 2025-04-19 ENCOUNTER — HOSPITAL ENCOUNTER (OUTPATIENT)
Dept: ULTRASOUND IMAGING | Facility: HOSPITAL | Age: 40
Discharge: HOME | End: 2025-04-19
Payer: COMMERCIAL

## 2025-04-19 DIAGNOSIS — R74.8 ABNORMAL LEVELS OF OTHER SERUM ENZYMES: ICD-10-CM

## 2025-04-19 DIAGNOSIS — E66.01 MORBID (SEVERE) OBESITY DUE TO EXCESS CALORIES (HCC): ICD-10-CM

## 2025-04-19 PROCEDURE — 76705 ECHO EXAM OF ABDOMEN: CPT

## 2025-04-24 ENCOUNTER — HOSPITAL ENCOUNTER (OUTPATIENT)
Dept: ULTRASOUND IMAGING | Facility: HOSPITAL | Age: 40
End: 2025-04-24
Attending: PHYSICIAN ASSISTANT
Payer: COMMERCIAL

## 2025-04-24 DIAGNOSIS — R22.2 LOCALIZED SWELLING, MASS AND LUMP, TRUNK: ICD-10-CM

## 2025-04-24 PROCEDURE — 76705 ECHO EXAM OF ABDOMEN: CPT

## 2025-04-29 ENCOUNTER — HOSPITAL ENCOUNTER (EMERGENCY)
Facility: HOSPITAL | Age: 40
Discharge: HOME | End: 2025-04-30
Attending: EMERGENCY MEDICINE
Payer: COMMERCIAL

## 2025-04-29 ENCOUNTER — APPOINTMENT (EMERGENCY)
Dept: RADIOLOGY | Facility: HOSPITAL | Age: 40
End: 2025-04-29
Payer: COMMERCIAL

## 2025-04-29 DIAGNOSIS — R07.89 ATYPICAL CHEST PAIN: Primary | ICD-10-CM

## 2025-04-29 LAB
ALBUMIN SERPL BCG-MCNC: 4.4 G/DL (ref 3.5–5)
ALP SERPL-CCNC: 58 U/L (ref 34–104)
ALT SERPL W P-5'-P-CCNC: 43 U/L (ref 7–52)
ANION GAP SERPL CALCULATED.3IONS-SCNC: 9 MMOL/L (ref 4–13)
APTT PPP: 30 SECONDS (ref 23–34)
AST SERPL W P-5'-P-CCNC: 24 U/L (ref 13–39)
BASOPHILS # BLD AUTO: 0.06 THOUSANDS/ÂΜL (ref 0–0.1)
BASOPHILS NFR BLD AUTO: 1 % (ref 0–1)
BILIRUB SERPL-MCNC: 0.56 MG/DL (ref 0.2–1)
BNP SERPL-MCNC: 18 PG/ML (ref 0–100)
BUN SERPL-MCNC: 12 MG/DL (ref 5–25)
CALCIUM SERPL-MCNC: 8.9 MG/DL (ref 8.4–10.2)
CARDIAC TROPONIN I PNL SERPL HS: 13 NG/L (ref ?–50)
CHLORIDE SERPL-SCNC: 105 MMOL/L (ref 96–108)
CO2 SERPL-SCNC: 23 MMOL/L (ref 21–32)
CREAT SERPL-MCNC: 0.99 MG/DL (ref 0.6–1.3)
D DIMER PPP FEU-MCNC: 0.28 UG/ML FEU
EOSINOPHIL # BLD AUTO: 0.25 THOUSAND/ÂΜL (ref 0–0.61)
EOSINOPHIL NFR BLD AUTO: 3 % (ref 0–6)
ERYTHROCYTE [DISTWIDTH] IN BLOOD BY AUTOMATED COUNT: 12.4 % (ref 11.6–15.1)
GFR SERPL CREATININE-BSD FRML MDRD: 96 ML/MIN/1.73SQ M
GLUCOSE SERPL-MCNC: 120 MG/DL (ref 65–140)
HCT VFR BLD AUTO: 43.3 % (ref 36.5–49.3)
HGB BLD-MCNC: 15 G/DL (ref 12–17)
IMM GRANULOCYTES # BLD AUTO: 0.08 THOUSAND/UL (ref 0–0.2)
IMM GRANULOCYTES NFR BLD AUTO: 1 % (ref 0–2)
INR PPP: 0.95 (ref 0.85–1.19)
LYMPHOCYTES # BLD AUTO: 3.4 THOUSANDS/ÂΜL (ref 0.6–4.47)
LYMPHOCYTES NFR BLD AUTO: 39 % (ref 14–44)
MCH RBC QN AUTO: 31.2 PG (ref 26.8–34.3)
MCHC RBC AUTO-ENTMCNC: 34.6 G/DL (ref 31.4–37.4)
MCV RBC AUTO: 90 FL (ref 82–98)
MONOCYTES # BLD AUTO: 0.93 THOUSAND/ÂΜL (ref 0.17–1.22)
MONOCYTES NFR BLD AUTO: 11 % (ref 4–12)
NEUTROPHILS # BLD AUTO: 3.94 THOUSANDS/ÂΜL (ref 1.85–7.62)
NEUTS SEG NFR BLD AUTO: 45 % (ref 43–75)
NRBC BLD AUTO-RTO: 0 /100 WBCS
PLATELET # BLD AUTO: 306 THOUSANDS/UL (ref 149–390)
PMV BLD AUTO: 9.8 FL (ref 8.9–12.7)
POTASSIUM SERPL-SCNC: 3.3 MMOL/L (ref 3.5–5.3)
PROT SERPL-MCNC: 7.2 G/DL (ref 6.4–8.4)
PROTHROMBIN TIME: 13.1 SECONDS (ref 12.3–15)
RBC # BLD AUTO: 4.81 MILLION/UL (ref 3.88–5.62)
SODIUM SERPL-SCNC: 137 MMOL/L (ref 135–147)
WBC # BLD AUTO: 8.66 THOUSAND/UL (ref 4.31–10.16)

## 2025-04-29 PROCEDURE — 85730 THROMBOPLASTIN TIME PARTIAL: CPT | Performed by: EMERGENCY MEDICINE

## 2025-04-29 PROCEDURE — 99285 EMERGENCY DEPT VISIT HI MDM: CPT | Performed by: EMERGENCY MEDICINE

## 2025-04-29 PROCEDURE — 36415 COLL VENOUS BLD VENIPUNCTURE: CPT | Performed by: EMERGENCY MEDICINE

## 2025-04-29 PROCEDURE — 96374 THER/PROPH/DIAG INJ IV PUSH: CPT

## 2025-04-29 PROCEDURE — 99285 EMERGENCY DEPT VISIT HI MDM: CPT

## 2025-04-29 PROCEDURE — 85379 FIBRIN DEGRADATION QUANT: CPT | Performed by: EMERGENCY MEDICINE

## 2025-04-29 PROCEDURE — 85610 PROTHROMBIN TIME: CPT | Performed by: EMERGENCY MEDICINE

## 2025-04-29 PROCEDURE — 83880 ASSAY OF NATRIURETIC PEPTIDE: CPT | Performed by: EMERGENCY MEDICINE

## 2025-04-29 PROCEDURE — 85025 COMPLETE CBC W/AUTO DIFF WBC: CPT | Performed by: EMERGENCY MEDICINE

## 2025-04-29 PROCEDURE — 71045 X-RAY EXAM CHEST 1 VIEW: CPT

## 2025-04-29 PROCEDURE — 84484 ASSAY OF TROPONIN QUANT: CPT | Performed by: EMERGENCY MEDICINE

## 2025-04-29 PROCEDURE — 80053 COMPREHEN METABOLIC PANEL: CPT | Performed by: EMERGENCY MEDICINE

## 2025-04-29 PROCEDURE — 93005 ELECTROCARDIOGRAM TRACING: CPT

## 2025-04-29 RX ORDER — KETOROLAC TROMETHAMINE 30 MG/ML
30 INJECTION, SOLUTION INTRAMUSCULAR; INTRAVENOUS ONCE
Status: COMPLETED | OUTPATIENT
Start: 2025-04-29 | End: 2025-04-29

## 2025-04-29 RX ORDER — POTASSIUM CHLORIDE 1500 MG/1
20 TABLET, EXTENDED RELEASE ORAL ONCE
Status: COMPLETED | OUTPATIENT
Start: 2025-04-29 | End: 2025-04-29

## 2025-04-29 RX ADMIN — POTASSIUM CHLORIDE 20 MEQ: 1500 TABLET, EXTENDED RELEASE ORAL at 23:47

## 2025-04-29 RX ADMIN — KETOROLAC TROMETHAMINE 30 MG: 30 INJECTION, SOLUTION INTRAMUSCULAR; INTRAVENOUS at 22:56

## 2025-04-30 VITALS
TEMPERATURE: 98 F | DIASTOLIC BLOOD PRESSURE: 63 MMHG | SYSTOLIC BLOOD PRESSURE: 104 MMHG | OXYGEN SATURATION: 96 % | HEART RATE: 79 BPM | RESPIRATION RATE: 18 BRPM

## 2025-04-30 LAB
2HR DELTA HS TROPONIN: -1 NG/L
ATRIAL RATE: 93 BPM
CARDIAC TROPONIN I PNL SERPL HS: 12 NG/L (ref ?–50)
P AXIS: 36 DEGREES
PR INTERVAL: 152 MS
QRS AXIS: 90 DEGREES
QRSD INTERVAL: 96 MS
QT INTERVAL: 354 MS
QTC INTERVAL: 440 MS
T WAVE AXIS: 44 DEGREES
VENTRICULAR RATE: 93 BPM

## 2025-04-30 PROCEDURE — 84484 ASSAY OF TROPONIN QUANT: CPT | Performed by: EMERGENCY MEDICINE

## 2025-04-30 PROCEDURE — 36415 COLL VENOUS BLD VENIPUNCTURE: CPT | Performed by: EMERGENCY MEDICINE

## 2025-04-30 NOTE — ED PROVIDER NOTES
Time reflects when diagnosis was documented in both MDM as applicable and the Disposition within this note       Time User Action Codes Description Comment    4/30/2025 12:48 AM Olivier Palacios Add [R07.89] Atypical chest pain           ED Disposition       ED Disposition   Discharge    Condition   Stable    Date/Time   Wed Apr 30, 2025 12:48 AM    Comment   Zacarias Sloan discharge to home/self care.                   Assessment & Plan       Medical Decision Making  Differential diagnosis include but not limited to pneumonia pulmonary embolism pneumothorax acute coronary syndrome arrhythmia.  Patient is afebrile nontoxic well-appearing clinically hemodynamically stable in the Emergency Department.  Workup in the ED reveals no evidence of acute cardiopulmonary pathology heart score 0 patient is low risk for major adverse cardiac event negative high-sensitivity troponin x 2 in the emergency department.  Negative delta troponin recommendation based on ACS algorithm is for DC with PCP follow-up patient is Wells low risk PERC negative D-dimer negative no further workup indicated for pulmonary embolism rule out.  Symptoms are reproducible to palpation and movement suspect musculoskeletal chest wall pain for now we will recommend anti-inflammatories over-the-counter and supportive care and rest and prompt follow-up with primary physician for further evaluation and treatment obtain test results.  return precautions and anticipatory guidance discussed.      Problems Addressed:  Atypical chest pain: acute illness or injury    Amount and/or Complexity of Data Reviewed  Labs: ordered. Decision-making details documented in ED Course.  Radiology: ordered and independent interpretation performed. Decision-making details documented in ED Course.  ECG/medicine tests: ordered and independent interpretation performed. Decision-making details documented in ED Course.    Risk  Prescription drug management.        ED Course as of  04/30/25 0050   Tue Apr 29, 2025 2325 Lower lung zone opacities noted on chest x-ray comparison to prior x-ray reveals similar.  Patient with no clinical symptoms suggestive of pneumonia at this time.         Medications   ketorolac (TORADOL) injection 30 mg (30 mg Intravenous Given 4/29/25 2256)   potassium chloride (Klor-Con M20) CR tablet 20 mEq (20 mEq Oral Given 4/29/25 2347)       ED Risk Strat Scores      HEART Risk Score      Flowsheet Row Most Recent Value   Heart Score Risk Calculator    History 0 Filed at: 04/30/2025 0048   ECG 0 Filed at: 04/30/2025 0048   Age 0 Filed at: 04/30/2025 0048   Risk Factors 0 Filed at: 04/30/2025 0048   Troponin 0 Filed at: 04/30/2025 0048   HEART Score 0 Filed at: 04/30/2025 0048                      No data recorded    PERC Rule for PE      Flowsheet Row Most Recent Value   PERC Rule for PE    Age >=50 0 Filed at: 04/29/2025 2335   HR >=100 0 Filed at: 04/29/2025 2335   O2 Sat on room air < 95% 0 Filed at: 04/29/2025 2335   History of PE or DVT 0 Filed at: 04/29/2025 2335   Recent trauma or surgery 0 Filed at: 04/29/2025 2335   Hemoptysis 0 Filed at: 04/29/2025 2335   Exogenous estrogen 0 Filed at: 04/29/2025 2335   Unilateral leg swelling 0 Filed at: 04/29/2025 2335   PERC Rule for PE Results 0 Filed at: 04/29/2025 2335                Wells' Criteria for PE      Flowsheet Row Most Recent Value   Wells' Criteria for PE    Clinical signs and symptoms of DVT 0 Filed at: 04/29/2025 2336   PE is primary diagnosis or equally likely 0 Filed at: 04/29/2025 2336   HR >100 0 Filed at: 04/29/2025 2336   Immobilization at least 3 days or Surgery in the previous 4 weeks 0 Filed at: 04/29/2025 2336   Previous, objectively diagnosed PE or DVT 0 Filed at: 04/29/2025 2336   Hemoptysis 0 Filed at: 04/29/2025 2336   Malignancy with treatment within 6 months or palliative 0 Filed at: 04/29/2025 2336   Wells' Criteria Total 0 Filed at: 04/29/2025 2336                        History of  Present Illness       Chief Complaint   Patient presents with    Chest Pain     C/o sudden onset of left sided chest pain while lying in bed. States pain worsens with breathing       History reviewed. No pertinent past medical history.   History reviewed. No pertinent surgical history.   History reviewed. No pertinent family history.   Social History     Tobacco Use    Smoking status: Every Day     Current packs/day: 1.00     Types: Cigarettes    Smokeless tobacco: Never   Vaping Use    Vaping status: Never Used   Substance Use Topics    Alcohol use: Yes     Comment: social    Drug use: Never      E-Cigarette/Vaping    E-Cigarette Use Never User       E-Cigarette/Vaping Substances      I have reviewed and agree with the history as documented.     Patient is a 39-year-old male no significant past medical or surgical history presents emergency department complaining of left-sided chest pain described as sharp stabbing pain in the left lower chest worse with movement and deep breathing started around 9 PM this evening still present no trauma or injury occurred no other associated symptoms.        History provided by:  Patient  Chest Pain  Pain location:  L lateral chest  Pain quality: sharp and stabbing    Associated symptoms: no abdominal pain, no cough, no fever, no headache, no nausea, no numbness, no palpitations, no shortness of breath, not vomiting and no weakness        Review of Systems   Constitutional:  Negative for fever.   Respiratory:  Negative for cough and shortness of breath.    Cardiovascular:  Positive for chest pain. Negative for palpitations.   Gastrointestinal:  Negative for abdominal pain, nausea and vomiting.   Neurological:  Negative for weakness, numbness and headaches.   All other systems reviewed and are negative.          Objective       ED Triage Vitals [04/29/25 2251]   Temperature Pulse Blood Pressure Respirations SpO2 Patient Position - Orthostatic VS   98 °F (36.7 °C) 94 131/84 20 95 %  Lying      Temp Source Heart Rate Source BP Location FiO2 (%) Pain Score    Temporal Monitor Left arm -- 7      Vitals      Date and Time Temp Pulse SpO2 Resp BP Pain Score FACES Pain Rating User   04/29/25 2345 -- 81 94 % 20 114/59 -- -- NM   04/29/25 2330 -- 82 93 % 22 114/59 -- -- NM   04/29/25 2256 -- -- -- -- -- 7 -- NM   04/29/25 2251 98 °F (36.7 °C) 94 95 % 20 131/84 7 --             Physical Exam  Vitals and nursing note reviewed.   Constitutional:       General: He is not in acute distress.     Appearance: Normal appearance.   HENT:      Head: Normocephalic and atraumatic.      Nose: Nose normal.   Eyes:      Conjunctiva/sclera: Conjunctivae normal.   Cardiovascular:      Rate and Rhythm: Normal rate and regular rhythm.   Pulmonary:      Effort: Pulmonary effort is normal. No respiratory distress.      Breath sounds: Normal breath sounds.   Chest:      Chest wall: Tenderness present. No deformity, crepitus or edema.   Skin:     General: Skin is dry.   Neurological:      General: No focal deficit present.      Mental Status: He is alert and oriented to person, place, and time.         Results Reviewed       Procedure Component Value Units Date/Time    HS Troponin I 2hr [[id number]]  (Normal) Collected: 04/30/25 0016    Lab Status: Final result Specimen: Blood from Arm, Right Updated: 04/30/25 0045     hs TnI 2hr 12 ng/L      Delta 2hr hsTnI -1 ng/L     HS Troponin I 4hr [[id number]]     Lab Status: No result Specimen: Blood     B-Type Natriuretic Peptide(BNP) [[id number]]  (Normal) Collected: 04/29/25 2255    Lab Status: Final result Specimen: Blood from Arm, Right Updated: 04/29/25 2340     BNP 18 pg/mL     HS Troponin 0hr (reflex protocol) [[id number]]  (Normal) Collected: 04/29/25 2255    Lab Status: Final result Specimen: Blood from Arm, Right Updated: 04/29/25 2327     hs TnI 0hr 13 ng/L     D-Dimer [[id number]]  (Normal) Collected: 04/29/25 2255    Lab Status: Final result Specimen: Blood from Arm, Right  Updated: 04/29/25 2320     D-Dimer, Quant 0.28 ug/ml U     Comprehensive metabolic panel [[id number]]  (Abnormal) Collected: 04/29/25 2255    Lab Status: Final result Specimen: Blood from Arm, Right Updated: 04/29/25 2319     Sodium 137 mmol/L      Potassium 3.3 mmol/L      Chloride 105 mmol/L      CO2 23 mmol/L      ANION GAP 9 mmol/L      BUN 12 mg/dL      Creatinine 0.99 mg/dL      Glucose 120 mg/dL      Calcium 8.9 mg/dL      AST 24 U/L      ALT 43 U/L      Alkaline Phosphatase 58 U/L      Total Protein 7.2 g/dL      Albumin 4.4 g/dL      Total Bilirubin 0.56 mg/dL      eGFR 96 ml/min/1.73sq m     Narrative:      [employer] guidelines for Chronic Kidney Disease (CKD):     Stage 1 with normal or high GFR (GFR > 90 mL/min/1.73 square meters)    Stage 2 Mild CKD (GFR = 60-89 mL/min/1.73 square meters)    Stage 3A Moderate CKD (GFR = 45-59 mL/min/1.73 square meters)    Stage 3B Moderate CKD (GFR = 30-44 mL/min/1.73 square meters)    Stage 4 Severe CKD (GFR = 15-29 mL/min/1.73 square meters)    Stage 5 End Stage CKD (GFR <15 mL/min/1.73 square meters)  Note: GFR calculation is accurate only with a steady state creatinine    Protime-INR [[id number]]  (Normal) Collected: 04/29/25 2255    Lab Status: Final result Specimen: Blood from Arm, Right Updated: 04/29/25 2318     Protime 13.1 seconds      INR 0.95    Narrative:      INR Therapeutic Range    Indication                                             INR Range      Atrial Fibrillation                                               2.0-3.0  Hypercoagulable State                                    2.0.2.3  Left Ventricular Asist Device                            2.0-3.0  Mechanical Heart Valve                                  -    Aortic(with afib, MI, embolism, HF, LA enlargement,    and/or coagulopathy)                                     2.0-3.0 (2.5-3.5)     Mitral                                                              2.5-3.5  Prosthetic/Bioprosthetic Heart Valve               2.0-3.0  Venous thromboembolism (VTE: VT, PE        2.0-3.0    APTT [[id number]]  (Normal) Collected: 04/29/25 2255    Lab Status: Final result Specimen: Blood from Arm, Right Updated: 04/29/25 2318     PTT 30 seconds     CBC and differential [[id number]] Collected: 04/29/25 2255    Lab Status: Final result Specimen: Blood from Arm, Right Updated: 04/29/25 2310     WBC 8.66 Thousand/uL      RBC 4.81 Million/uL      Hemoglobin 15.0 g/dL      Hematocrit 43.3 %      MCV 90 fL      MCH 31.2 pg      MCHC 34.6 g/dL      RDW 12.4 %      MPV 9.8 fL      Platelets 306 Thousands/uL      nRBC 0 /100 WBCs      Segmented % 45 %      Immature Grans % 1 %      Lymphocytes % 39 %      Monocytes % 11 %      Eosinophils Relative 3 %      Basophils Relative 1 %      Absolute Neutrophils 3.94 Thousands/µL      Absolute Immature Grans 0.08 Thousand/uL      Absolute Lymphocytes 3.40 Thousands/µL      Absolute Monocytes 0.93 Thousand/µL      Eosinophils Absolute 0.25 Thousand/µL      Basophils Absolute 0.06 Thousands/µL             XR chest 1 view portable   ED Interpretation by Olivier Palacios DO (04/29 2325)   Bilateral lower lung zone opacities possibly pneumonia.            ECG 12 Lead Documentation Only    Date/Time: 4/29/2025 10:54 PM    Performed by: Olivier Palacios DO  Authorized by: Olivier Palacios DO    ECG reviewed by me, the ED Provider: yes    Patient location:  ED  Previous ECG:     Comparison to cardiac monitor: Yes    Quality:     Tracing quality:  Limited by artifact  Rate:     ECG rate:  93    ECG rate assessment: normal    Rhythm:     Rhythm: sinus rhythm    QRS:     QRS axis:  Right    QRS intervals:  Normal  Conduction:     Conduction: normal    ST segments:     ST segments:  Normal  T waves:     T waves: normal        ED Medication and Procedure Management   None     Patient's Medications    No medications on file     No discharge procedures on file.  ED  SEPSIS DOCUMENTATION   Time reflects when diagnosis was documented in both MDM as applicable and the Disposition within this note       Time User Action Codes Description Comment    4/30/2025 12:48 AM Olivier Palacios Add [R07.89] Atypical chest pain                  Olivier Palacios DO  04/30/25 0050

## 2025-04-30 NOTE — ED NOTES
Per verbalized by Dr. Suzanne muniz pts 2nd troponin now.     Socorro Ferreira, RN  04/30/25 0015